# Patient Record
Sex: MALE | Race: OTHER | NOT HISPANIC OR LATINO | URBAN - METROPOLITAN AREA
[De-identification: names, ages, dates, MRNs, and addresses within clinical notes are randomized per-mention and may not be internally consistent; named-entity substitution may affect disease eponyms.]

---

## 2019-07-05 ENCOUNTER — EMERGENCY (EMERGENCY)
Facility: HOSPITAL | Age: 49
LOS: 1 days | Discharge: HOME | End: 2019-07-05
Attending: EMERGENCY MEDICINE | Admitting: EMERGENCY MEDICINE
Payer: COMMERCIAL

## 2019-07-05 VITALS
OXYGEN SATURATION: 100 % | HEART RATE: 47 BPM | SYSTOLIC BLOOD PRESSURE: 129 MMHG | TEMPERATURE: 98 F | RESPIRATION RATE: 16 BRPM | DIASTOLIC BLOOD PRESSURE: 88 MMHG

## 2019-07-05 VITALS
TEMPERATURE: 98 F | HEART RATE: 58 BPM | SYSTOLIC BLOOD PRESSURE: 108 MMHG | RESPIRATION RATE: 16 BRPM | WEIGHT: 179.9 LBS | OXYGEN SATURATION: 100 % | HEIGHT: 67 IN | DIASTOLIC BLOOD PRESSURE: 77 MMHG

## 2019-07-05 LAB
ALBUMIN SERPL ELPH-MCNC: 4.4 G/DL — SIGNIFICANT CHANGE UP (ref 3.3–5)
ALP SERPL-CCNC: 60 U/L — SIGNIFICANT CHANGE UP (ref 40–120)
ALT FLD-CCNC: 29 U/L — SIGNIFICANT CHANGE UP (ref 4–41)
ANION GAP SERPL CALC-SCNC: 11 MMO/L — SIGNIFICANT CHANGE UP (ref 7–14)
AST SERPL-CCNC: 26 U/L — SIGNIFICANT CHANGE UP (ref 4–40)
BASOPHILS # BLD AUTO: 0.02 K/UL — SIGNIFICANT CHANGE UP (ref 0–0.2)
BASOPHILS NFR BLD AUTO: 0.3 % — SIGNIFICANT CHANGE UP (ref 0–2)
BILIRUB SERPL-MCNC: 0.4 MG/DL — SIGNIFICANT CHANGE UP (ref 0.2–1.2)
BUN SERPL-MCNC: 18 MG/DL — SIGNIFICANT CHANGE UP (ref 7–23)
CALCIUM SERPL-MCNC: 9.6 MG/DL — SIGNIFICANT CHANGE UP (ref 8.4–10.5)
CHLORIDE SERPL-SCNC: 104 MMOL/L — SIGNIFICANT CHANGE UP (ref 98–107)
CO2 SERPL-SCNC: 25 MMOL/L — SIGNIFICANT CHANGE UP (ref 22–31)
CREAT SERPL-MCNC: 1.23 MG/DL — SIGNIFICANT CHANGE UP (ref 0.5–1.3)
D DIMER BLD IA.RAPID-MCNC: < 150 NG/ML — SIGNIFICANT CHANGE UP
EOSINOPHIL # BLD AUTO: 0.12 K/UL — SIGNIFICANT CHANGE UP (ref 0–0.5)
EOSINOPHIL NFR BLD AUTO: 1.9 % — SIGNIFICANT CHANGE UP (ref 0–6)
GLUCOSE SERPL-MCNC: 93 MG/DL — SIGNIFICANT CHANGE UP (ref 70–99)
HCT VFR BLD CALC: 41.1 % — SIGNIFICANT CHANGE UP (ref 39–50)
HGB BLD-MCNC: 13.4 G/DL — SIGNIFICANT CHANGE UP (ref 13–17)
IMM GRANULOCYTES NFR BLD AUTO: 0.2 % — SIGNIFICANT CHANGE UP (ref 0–1.5)
LYMPHOCYTES # BLD AUTO: 3.53 K/UL — HIGH (ref 1–3.3)
LYMPHOCYTES # BLD AUTO: 56.8 % — HIGH (ref 13–44)
MCHC RBC-ENTMCNC: 26.3 PG — LOW (ref 27–34)
MCHC RBC-ENTMCNC: 32.6 % — SIGNIFICANT CHANGE UP (ref 32–36)
MCV RBC AUTO: 80.7 FL — SIGNIFICANT CHANGE UP (ref 80–100)
MONOCYTES # BLD AUTO: 0.51 K/UL — SIGNIFICANT CHANGE UP (ref 0–0.9)
MONOCYTES NFR BLD AUTO: 8.2 % — SIGNIFICANT CHANGE UP (ref 2–14)
NEUTROPHILS # BLD AUTO: 2.03 K/UL — SIGNIFICANT CHANGE UP (ref 1.8–7.4)
NEUTROPHILS NFR BLD AUTO: 32.6 % — LOW (ref 43–77)
NRBC # FLD: 0 K/UL — SIGNIFICANT CHANGE UP (ref 0–0)
PLATELET # BLD AUTO: 229 K/UL — SIGNIFICANT CHANGE UP (ref 150–400)
PMV BLD: 9.4 FL — SIGNIFICANT CHANGE UP (ref 7–13)
POTASSIUM SERPL-MCNC: 3.9 MMOL/L — SIGNIFICANT CHANGE UP (ref 3.5–5.3)
POTASSIUM SERPL-SCNC: 3.9 MMOL/L — SIGNIFICANT CHANGE UP (ref 3.5–5.3)
PROT SERPL-MCNC: 7.6 G/DL — SIGNIFICANT CHANGE UP (ref 6–8.3)
RBC # BLD: 5.09 M/UL — SIGNIFICANT CHANGE UP (ref 4.2–5.8)
RBC # FLD: 13.2 % — SIGNIFICANT CHANGE UP (ref 10.3–14.5)
SODIUM SERPL-SCNC: 140 MMOL/L — SIGNIFICANT CHANGE UP (ref 135–145)
TROPONIN T, HIGH SENSITIVITY: < 6 NG/L — SIGNIFICANT CHANGE UP (ref ?–14)
TROPONIN T, HIGH SENSITIVITY: < 6 NG/L — SIGNIFICANT CHANGE UP (ref ?–14)
WBC # BLD: 6.22 K/UL — SIGNIFICANT CHANGE UP (ref 3.8–10.5)
WBC # FLD AUTO: 6.22 K/UL — SIGNIFICANT CHANGE UP (ref 3.8–10.5)

## 2019-07-05 PROCEDURE — 99284 EMERGENCY DEPT VISIT MOD MDM: CPT | Mod: 25

## 2019-07-05 PROCEDURE — 71046 X-RAY EXAM CHEST 2 VIEWS: CPT | Mod: 26

## 2019-07-05 PROCEDURE — 93010 ELECTROCARDIOGRAM REPORT: CPT | Mod: NC

## 2019-07-05 RX ORDER — KETOROLAC TROMETHAMINE 30 MG/ML
15 SYRINGE (ML) INJECTION ONCE
Refills: 0 | Status: DISCONTINUED | OUTPATIENT
Start: 2019-07-05 | End: 2019-07-05

## 2019-07-05 RX ORDER — ASPIRIN/CALCIUM CARB/MAGNESIUM 324 MG
81 TABLET ORAL DAILY
Refills: 0 | Status: DISCONTINUED | OUTPATIENT
Start: 2019-07-05 | End: 2019-07-09

## 2019-07-05 RX ORDER — FENTANYL CITRATE 50 UG/ML
25 INJECTION INTRAVENOUS ONCE
Refills: 0 | Status: DISCONTINUED | OUTPATIENT
Start: 2019-07-05 | End: 2019-07-05

## 2019-07-05 RX ADMIN — Medication 15 MILLIGRAM(S): at 19:56

## 2019-07-05 RX ADMIN — Medication 81 MILLIGRAM(S): at 21:14

## 2019-07-05 NOTE — ED PROVIDER NOTE - ATTENDING CONTRIBUTION TO CARE
ED Attending (Dr Mackey): I have personally performed a face to face bedside history and physical examination of this patient.  I have discussed the case with the PA and  I have personally authored the following components: HPI, ROS, Physical Exam and MDM in addition to reviewing and revising the rest of the Provider Note. 48 y.o male p/w pleuritic left sided chest pain, unclear etiology.  No PE risk factors but would d dimer.  Also, send labs and ECG to assess for CAD.  Pain control reassess.

## 2019-07-05 NOTE — ED PROVIDER NOTE - NSFOLLOWUPINSTRUCTIONS_ED_ALL_ED_FT
Follow up with Cardiologist in 24- 48 hours- referral list provided.  Follow up with your primary care physician in 48-72 hours- bring copies of your results.   Return to the Emergency Department for chest pain, shortness of breath, worsening or persistent symptoms OR ANY NEW OR CONCERNING SYMPTOMS.

## 2019-07-05 NOTE — ED PROVIDER NOTE - NSTIMEPROVIDERCAREINITIATE_GEN_ER
Patient is a 28y old  Male who presents with a chief complaint of Chest Pain, DKA (07 Jan 2019 16:10)      SUBJECTIVE / OVERNIGHT EVENTS: Pt w/ no complaints. He is excited to be going home. His father is at bedside.     Review of Systems:     MEDICATIONS  (STANDING):  aspirin  chewable 81 milliGRAM(s) Oral daily  atorvastatin 80 milliGRAM(s) Oral at bedtime  benztropine 1 milliGRAM(s) Oral two times a day  clopidogrel Tablet 75 milliGRAM(s) Oral daily  dextrose 5%. 1000 milliLiter(s) (50 mL/Hr) IV Continuous <Continuous>  dextrose 50% Injectable 12.5 Gram(s) IV Push once  dextrose 50% Injectable 25 Gram(s) IV Push once  dextrose 50% Injectable 25 Gram(s) IV Push once  diVALproex  milliGRAM(s) Oral at bedtime  diVALproex  milliGRAM(s) Oral <User Schedule>  enoxaparin Injectable 40 milliGRAM(s) SubCutaneous every 24 hours  haloperidol     Tablet 20 milliGRAM(s) Oral at bedtime  insulin glargine Injectable (LANTUS) 29 Unit(s) SubCutaneous every morning  insulin lispro (HumaLOG) corrective regimen sliding scale   SubCutaneous at bedtime  insulin lispro (HumaLOG) corrective regimen sliding scale   SubCutaneous three times a day before meals  insulin lispro Injectable (HumaLOG) 15 Unit(s) SubCutaneous before dinner  insulin lispro Injectable (HumaLOG) 15 Unit(s) SubCutaneous before lunch  insulin lispro Injectable (HumaLOG) 15 Unit(s) SubCutaneous with breakfast  lisinopril 5 milliGRAM(s) Oral daily    MEDICATIONS  (PRN):  dextrose 40% Gel 15 Gram(s) Oral once PRN Blood Glucose LESS THAN 70 milliGRAM(s)/deciliter  glucagon  Injectable 1 milliGRAM(s) IntraMuscular once PRN Glucose LESS THAN 70 milligrams/deciliter  OLANZapine Injectable 5 milliGRAM(s) IntraMuscular every 6 hours PRN Severe Agitation      PHYSICAL EXAM:    I&O's Summary  Vital Signs Last 24 Hrs  T(C): 36.7 (08 Jan 2019 06:29), Max: 36.7 (08 Jan 2019 06:29)  T(F): 98 (08 Jan 2019 06:29), Max: 98 (08 Jan 2019 06:29)  HR: 73 (08 Jan 2019 06:29) (73 - 110)  BP: 95/61 (08 Jan 2019 06:29) (95/61 - 103/69)  BP(mean): --  RR: 20 (08 Jan 2019 06:29) (19 - 20)  SpO2: 100% (08 Jan 2019 06:29) (98% - 100%)  PHYSICAL EXAM  GENERAL: NAD, well-developed  HEAD:  Atraumatic, Normocephalic  EYES: EOMI,  conjunctiva and sclera clear  NECK: Supple,  CHEST/LUNG: Clear to auscultation bilaterally; No wheeze  HEART: Regular rate and rhythm; No murmurs, rubs, or gallops  ABDOMEN: Soft, Nontender, Nondistended; Bowel sounds present  EXTREMITIES:  2+ Peripheral Pulses, No clubbing, cyanosis, or edema  PSYCH: calm  SKIN: No rashes or lesions    LABS:  CAPILLARY BLOOD GLUCOSE      POCT Blood Glucose.: 139 mg/dL (08 Jan 2019 08:37)  POCT Blood Glucose.: 164 mg/dL (07 Jan 2019 21:21)  POCT Blood Glucose.: 176 mg/dL (07 Jan 2019 18:46)  POCT Blood Glucose.: 322 mg/dL (07 Jan 2019 12:58)  POCT Blood Glucose.: >600 mg/dL (07 Jan 2019 12:55)                      RADIOLOGY & ADDITIONAL TESTS:    Imaging Personally Reviewed:    Consultant(s) Notes Reviewed:      Care Discussed with Consultants/Other Providers: 05-Jul-2019 18:31

## 2019-07-05 NOTE — ED PROVIDER NOTE - OBJECTIVE STATEMENT
48 y.o male no pmhx coming in with nonradiating, nonexertional L sided chest pain/squeezing since this morning, no associated SOB , diaphoresis or lightheadedness. Denies any trauma. No recent travel, no LE swelling. No personal or family hx of heart disease. No recent illness. Denies fevers, chills, sob, cough, n/v/d, abdominal pain, numbness, tingling, weakness. 48 y.o male no pmhx coming in with nonradiating, nonexertional , pleuritic L sided chest pain/squeezing since this morning, no associated SOB , diaphoresis or lightheadedness. Denies any trauma. No recent travel, no LE swelling. No personal or family hx of heart disease. No recent illness. Denies fevers, chills, sob, cough, n/v/d, abdominal pain, numbness, tingling, weakness. 48 y.o male non contrib pmhx coming in with nonradiating, nonexertional , pleuritic L sided chest pain/squeezing since this morning, no associated SOB , diaphoresis or lightheadedness. Denies any trauma. No recent travel, no LE swelling. No personal or family hx of heart disease. No recent illness. Denies fevers, chills, sob, cough, n/v/d, abdominal pain, numbness, tingling, weakness.

## 2019-07-05 NOTE — ED ADULT TRIAGE NOTE - CHIEF COMPLAINT QUOTE
Pt presents with a squeezing pain to left side of his chest since this morning + reproducible   PMH: Denies

## 2019-07-05 NOTE — ED ADULT NURSE REASSESSMENT NOTE - NS ED NURSE REASSESS COMMENT FT1
report received from previous shift. pt A&Ox4, sitting upright on stretcher, respirations even non labore.d sinus annette on tele, VS as noted. family at bedside. awaiting results and dispo, will ctm.

## 2019-07-05 NOTE — ED ADULT NURSE NOTE - NSIMPLEMENTINTERV_GEN_ALL_ED
Implemented All Universal Safety Interventions:  [place] to call system. Call bell, personal items and telephone within reach. Instruct patient to call for assistance. Room bathroom lighting operational. Non-slip footwear when patient is off stretcher. Physically safe environment: no spills, clutter or unnecessary equipment. Stretcher in lowest position, wheels locked, appropriate side rails in place.

## 2019-07-05 NOTE — ED PROVIDER NOTE - CLINICAL SUMMARY MEDICAL DECISION MAKING FREE TEXT BOX
48 y.o male no pmhx coming in with nonradiating, nonexertional L sided chest pain/squeezing since this morning, no associated SOB , diaphoresis or lightheadedness. Denies any trauma. No recent travel, no LE swelling. No personal or family hx of heart disease. Pain reproducible on exam, EKG non ischemic. Will check basic labs, trop, xray chest, reassess. 48 y.o male no pmhx coming in with nonradiating, nonexertional L sided chest pain/squeezing since this morning, no associated SOB , diaphoresis or lightheadedness. Denies any trauma. No recent travel, no LE swelling. No personal or family hx of heart disease. Pain reproducible on exam, EKG non ischemic. Will check basic labs, trop, xray chest, likely dc home with outpatient followup. 48 y.o male no pmhx coming in with nonradiating, nonexertional, pleuritic L sided chest pain/squeezing since this morning, no associated SOB , diaphoresis or lightheadedness. Denies any trauma. No recent travel, no LE swelling. No personal or family hx of heart disease. Pain reproducible on exam, EKG non ischemic. Will check basic labs, ddimer, trop, xray chest, reassess. 48 y.o male no pmhx coming in with nonradiating, nonexertional, pleuritic L sided chest pain/squeezing since this morning, no associated SOB , diaphoresis or lightheadedness. Denies any trauma. No recent travel, no LE swelling. No personal or family hx of heart disease. Pain reproducible on exam, EKG non ischemic. Will check basic labs, ddimer, trop, xray chest. 48 y.o male p/w pleuritic left sided chest pain, unclear etiology.  No PE risk factors but would d dimer.  Also, send labs and ECG to assess for CAD.  Pain control reassess.

## 2019-07-05 NOTE — ED PROVIDER NOTE - PROGRESS NOTE DETAILS
GAVI Rossi: Pt feeling much better, trop neg x 2. xray chest wnl. offered patient admission for further cardiac workup. Pt is a&Ox3, competent to make own decisions. understands the risks of not having further workup inpatient and risk of worsening of symptoms/heart attack/death. Pt advised to follow up with cardiologist , referral list provided. If any new or worsening symptoms to return to the ED. GAVI Rossi: Pt feeling much better, asymptomatic. trop neg x 2. xray chest wnl. offered patient admission for further cardiac workup but declining at this time. Pt is a&Ox3, competent to make own decisions. understands the risks of not having further workup inpatient and risk of worsening of symptoms/heart attack/death. Expressed understanding and prefers to followup outpatient. Pt advised to follow up with cardiologist , referral list provided. If any new or worsening symptoms to return to the ED. GAVI Rossi: Pt feeling much better, asymptomatic. trop neg x 2. xray chest wnl. offered patient admission for further cardiac workup but declining at this time. Pt is a&Ox3, appears to possess decisinoal capacity. Understands the risks of not having further workup inpatient and risk of worsening of symptoms/heart attack/death. Expressed understanding and prefers to followup outpatient. Pt advised to follow up with cardiologist , referral list provided. If any new or worsening symptoms to return to the ED.

## 2019-07-05 NOTE — ED ADULT NURSE NOTE - OBJECTIVE STATEMENT
Pt presents to ED with left sided chest pain that started yesterday. Left chest tender to touch, nonradiating. Pt AxOx3, ambulatory. Pt denies shortness of breath, breathing even and unlabored. Pt denies abd pain, n/v/d. Pt denies dysuria and hematuria. SKin clean dry and intact. 20G IVL placed in the L AC. Will continue to monitor.

## 2022-10-25 ENCOUNTER — EMERGENCY (EMERGENCY)
Facility: HOSPITAL | Age: 52
LOS: 1 days | Discharge: HOME | End: 2022-10-25
Attending: EMERGENCY MEDICINE | Admitting: EMERGENCY MEDICINE

## 2022-10-25 VITALS
OXYGEN SATURATION: 100 % | HEART RATE: 60 BPM | DIASTOLIC BLOOD PRESSURE: 89 MMHG | RESPIRATION RATE: 18 BRPM | SYSTOLIC BLOOD PRESSURE: 128 MMHG | TEMPERATURE: 98 F | HEIGHT: 67 IN

## 2022-10-25 VITALS
TEMPERATURE: 98 F | OXYGEN SATURATION: 100 % | HEART RATE: 55 BPM | SYSTOLIC BLOOD PRESSURE: 121 MMHG | DIASTOLIC BLOOD PRESSURE: 83 MMHG | RESPIRATION RATE: 18 BRPM

## 2022-10-25 LAB
ALBUMIN SERPL ELPH-MCNC: 4.2 G/DL — SIGNIFICANT CHANGE UP (ref 3.3–5)
ALP SERPL-CCNC: 64 U/L — SIGNIFICANT CHANGE UP (ref 40–120)
ALT FLD-CCNC: 32 U/L — SIGNIFICANT CHANGE UP (ref 4–41)
ANION GAP SERPL CALC-SCNC: 10 MMOL/L — SIGNIFICANT CHANGE UP (ref 7–14)
AST SERPL-CCNC: 29 U/L — SIGNIFICANT CHANGE UP (ref 4–40)
B PERT DNA SPEC QL NAA+PROBE: SIGNIFICANT CHANGE UP
B PERT+PARAPERT DNA PNL SPEC NAA+PROBE: SIGNIFICANT CHANGE UP
BASE EXCESS BLDV CALC-SCNC: -1.4 MMOL/L — SIGNIFICANT CHANGE UP (ref -2–3)
BASOPHILS # BLD AUTO: 0.04 K/UL — SIGNIFICANT CHANGE UP (ref 0–0.2)
BASOPHILS NFR BLD AUTO: 0.6 % — SIGNIFICANT CHANGE UP (ref 0–2)
BILIRUB SERPL-MCNC: 0.2 MG/DL — SIGNIFICANT CHANGE UP (ref 0.2–1.2)
BLOOD GAS VENOUS COMPREHENSIVE RESULT: SIGNIFICANT CHANGE UP
BORDETELLA PARAPERTUSSIS (RAPRVP): SIGNIFICANT CHANGE UP
BUN SERPL-MCNC: 17 MG/DL — SIGNIFICANT CHANGE UP (ref 7–23)
C PNEUM DNA SPEC QL NAA+PROBE: SIGNIFICANT CHANGE UP
CALCIUM SERPL-MCNC: 8.8 MG/DL — SIGNIFICANT CHANGE UP (ref 8.4–10.5)
CHLORIDE BLDV-SCNC: 104 MMOL/L — SIGNIFICANT CHANGE UP (ref 96–108)
CHLORIDE SERPL-SCNC: 106 MMOL/L — SIGNIFICANT CHANGE UP (ref 98–107)
CO2 BLDV-SCNC: 26.7 MMOL/L — HIGH (ref 22–26)
CO2 SERPL-SCNC: 22 MMOL/L — SIGNIFICANT CHANGE UP (ref 22–31)
CREAT SERPL-MCNC: 1.02 MG/DL — SIGNIFICANT CHANGE UP (ref 0.5–1.3)
EGFR: 90 ML/MIN/1.73M2 — SIGNIFICANT CHANGE UP
EOSINOPHIL # BLD AUTO: 0.49 K/UL — SIGNIFICANT CHANGE UP (ref 0–0.5)
EOSINOPHIL NFR BLD AUTO: 6.9 % — HIGH (ref 0–6)
FLUAV SUBTYP SPEC NAA+PROBE: SIGNIFICANT CHANGE UP
FLUBV RNA SPEC QL NAA+PROBE: SIGNIFICANT CHANGE UP
GAS PNL BLDV: 135 MMOL/L — LOW (ref 136–145)
GLUCOSE BLDV-MCNC: 111 MG/DL — HIGH (ref 70–99)
GLUCOSE SERPL-MCNC: 117 MG/DL — HIGH (ref 70–99)
HADV DNA SPEC QL NAA+PROBE: SIGNIFICANT CHANGE UP
HCO3 BLDV-SCNC: 25 MMOL/L — SIGNIFICANT CHANGE UP (ref 22–29)
HCOV 229E RNA SPEC QL NAA+PROBE: SIGNIFICANT CHANGE UP
HCOV HKU1 RNA SPEC QL NAA+PROBE: SIGNIFICANT CHANGE UP
HCOV NL63 RNA SPEC QL NAA+PROBE: SIGNIFICANT CHANGE UP
HCOV OC43 RNA SPEC QL NAA+PROBE: SIGNIFICANT CHANGE UP
HCT VFR BLD CALC: 39.2 % — SIGNIFICANT CHANGE UP (ref 39–50)
HCT VFR BLDA CALC: 39 % — SIGNIFICANT CHANGE UP (ref 39–51)
HGB BLD CALC-MCNC: 13.1 G/DL — SIGNIFICANT CHANGE UP (ref 13–17)
HGB BLD-MCNC: 12.7 G/DL — LOW (ref 13–17)
HMPV RNA SPEC QL NAA+PROBE: SIGNIFICANT CHANGE UP
HPIV1 RNA SPEC QL NAA+PROBE: SIGNIFICANT CHANGE UP
HPIV2 RNA SPEC QL NAA+PROBE: SIGNIFICANT CHANGE UP
HPIV3 RNA SPEC QL NAA+PROBE: SIGNIFICANT CHANGE UP
HPIV4 RNA SPEC QL NAA+PROBE: SIGNIFICANT CHANGE UP
IANC: 2.6 K/UL — SIGNIFICANT CHANGE UP (ref 1.8–7.4)
IMM GRANULOCYTES NFR BLD AUTO: 0.1 % — SIGNIFICANT CHANGE UP (ref 0–0.9)
LACTATE BLDV-MCNC: 1.5 MMOL/L — SIGNIFICANT CHANGE UP (ref 0.5–2)
LYMPHOCYTES # BLD AUTO: 3.36 K/UL — HIGH (ref 1–3.3)
LYMPHOCYTES # BLD AUTO: 47.2 % — HIGH (ref 13–44)
M PNEUMO DNA SPEC QL NAA+PROBE: SIGNIFICANT CHANGE UP
MCHC RBC-ENTMCNC: 27.5 PG — SIGNIFICANT CHANGE UP (ref 27–34)
MCHC RBC-ENTMCNC: 32.4 GM/DL — SIGNIFICANT CHANGE UP (ref 32–36)
MCV RBC AUTO: 85 FL — SIGNIFICANT CHANGE UP (ref 80–100)
MONOCYTES # BLD AUTO: 0.62 K/UL — SIGNIFICANT CHANGE UP (ref 0–0.9)
MONOCYTES NFR BLD AUTO: 8.7 % — SIGNIFICANT CHANGE UP (ref 2–14)
NEUTROPHILS # BLD AUTO: 2.6 K/UL — SIGNIFICANT CHANGE UP (ref 1.8–7.4)
NEUTROPHILS NFR BLD AUTO: 36.5 % — LOW (ref 43–77)
NRBC # BLD: 0 /100 WBCS — SIGNIFICANT CHANGE UP (ref 0–0)
NRBC # FLD: 0 K/UL — SIGNIFICANT CHANGE UP (ref 0–0)
PCO2 BLDV: 49 MMHG — SIGNIFICANT CHANGE UP (ref 42–55)
PH BLDV: 7.32 — SIGNIFICANT CHANGE UP (ref 7.32–7.43)
PLATELET # BLD AUTO: 208 K/UL — SIGNIFICANT CHANGE UP (ref 150–400)
PO2 BLDV: 30 MMHG — SIGNIFICANT CHANGE UP
POTASSIUM BLDV-SCNC: 3.7 MMOL/L — SIGNIFICANT CHANGE UP (ref 3.5–5.1)
POTASSIUM SERPL-MCNC: 3.9 MMOL/L — SIGNIFICANT CHANGE UP (ref 3.5–5.3)
POTASSIUM SERPL-SCNC: 3.9 MMOL/L — SIGNIFICANT CHANGE UP (ref 3.5–5.3)
PROT SERPL-MCNC: 6.9 G/DL — SIGNIFICANT CHANGE UP (ref 6–8.3)
RAPID RVP RESULT: SIGNIFICANT CHANGE UP
RBC # BLD: 4.61 M/UL — SIGNIFICANT CHANGE UP (ref 4.2–5.8)
RBC # FLD: 13.4 % — SIGNIFICANT CHANGE UP (ref 10.3–14.5)
RSV RNA SPEC QL NAA+PROBE: SIGNIFICANT CHANGE UP
RV+EV RNA SPEC QL NAA+PROBE: SIGNIFICANT CHANGE UP
SAO2 % BLDV: 53 % — SIGNIFICANT CHANGE UP
SARS-COV-2 RNA SPEC QL NAA+PROBE: SIGNIFICANT CHANGE UP
SODIUM SERPL-SCNC: 138 MMOL/L — SIGNIFICANT CHANGE UP (ref 135–145)
TROPONIN T, HIGH SENSITIVITY RESULT: 6 NG/L — SIGNIFICANT CHANGE UP
TROPONIN T, HIGH SENSITIVITY RESULT: 7 NG/L — SIGNIFICANT CHANGE UP
WBC # BLD: 7.12 K/UL — SIGNIFICANT CHANGE UP (ref 3.8–10.5)
WBC # FLD AUTO: 7.12 K/UL — SIGNIFICANT CHANGE UP (ref 3.8–10.5)

## 2022-10-25 PROCEDURE — 76705 ECHO EXAM OF ABDOMEN: CPT | Mod: 26

## 2022-10-25 PROCEDURE — 93016 CV STRESS TEST SUPVJ ONLY: CPT | Mod: GC

## 2022-10-25 PROCEDURE — 93010 ELECTROCARDIOGRAM REPORT: CPT

## 2022-10-25 PROCEDURE — 93018 CV STRESS TEST I&R ONLY: CPT | Mod: GC

## 2022-10-25 PROCEDURE — 71046 X-RAY EXAM CHEST 2 VIEWS: CPT | Mod: 26

## 2022-10-25 PROCEDURE — 99236 HOSP IP/OBS SAME DATE HI 85: CPT

## 2022-10-25 PROCEDURE — 93306 TTE W/DOPPLER COMPLETE: CPT | Mod: 26

## 2022-10-25 PROCEDURE — 99284 EMERGENCY DEPT VISIT MOD MDM: CPT

## 2022-10-25 PROCEDURE — 99053 MED SERV 10PM-8AM 24 HR FAC: CPT

## 2022-10-25 PROCEDURE — 78452 HT MUSCLE IMAGE SPECT MULT: CPT | Mod: 26,MA

## 2022-10-25 RX ORDER — FAMOTIDINE 10 MG/ML
20 INJECTION INTRAVENOUS ONCE
Refills: 0 | Status: COMPLETED | OUTPATIENT
Start: 2022-10-25 | End: 2022-10-25

## 2022-10-25 RX ORDER — ASPIRIN/CALCIUM CARB/MAGNESIUM 324 MG
324 TABLET ORAL ONCE
Refills: 0 | Status: COMPLETED | OUTPATIENT
Start: 2022-10-25 | End: 2022-10-25

## 2022-10-25 RX ADMIN — Medication 324 MILLIGRAM(S): at 02:11

## 2022-10-25 RX ADMIN — FAMOTIDINE 20 MILLIGRAM(S): 10 INJECTION INTRAVENOUS at 02:13

## 2022-10-25 RX ADMIN — Medication 30 MILLILITER(S): at 02:14

## 2022-10-25 NOTE — ED PROVIDER NOTE - PROGRESS NOTE DETAILS
Jesse Bell MD  troponin 6, CXR showed clear lungs. Will admit to CDU for cards stress test and ECHO. Patient reports no change from prior and is amenable to plan.

## 2022-10-25 NOTE — ED CDU PROVIDER DISPOSITION NOTE - CLINICAL COURSE
52 yo M with no significant PMH presents to ED c/o chest pain and SOB for several days. Reports pain localized to the left chest with radiation to left shoulder and back, has been constant, rated 10/10, sharp. He has not had similar symptoms prior. He also endorses associated SOB worse with exertion, dizziness, blurred vision, palpitations, and abdominal pain. He has not seen a physician in several years due to not having any health issues that he is aware of. He otherwise denies fever, chills, nausea, vomiting, urinary symptoms, LE pain/swelling, recent travel, sick contacts, recent surgeries. no family history of CAD.  In ER, ekg nonischemic, CXR clear, serial trop negative, RUQ US negative acute finding. Pt sent to cdu for stress, TTE, tele. Stress test abnormal, finding suggestive of mild cardiomyopathy, echo with EF of 54%. Pt seen by tele doc Dr. Peralta, states patient is clear for discharge for outpatient follow up. Pt is medically stable for discharge and follow up with PMD and cardiology. 50 yo M with no significant PMH presents to ED c/o chest pain and SOB for several days. Reports pain localized to the left chest with radiation to left shoulder and back, has been constant, rated 10/10, sharp. He has not had similar symptoms prior. He also endorses associated SOB worse with exertion, dizziness, blurred vision, palpitations, and abdominal pain. He has not seen a physician in several years due to not having any health issues that he is aware of. He otherwise denies fever, chills, nausea, vomiting, urinary symptoms, LE pain/swelling, recent travel, sick contacts, recent surgeries. no family history of CAD.  In ER, ekg nonischemic, CXR clear, serial trop negative, RUQ US negative acute finding. Pt sent to cdu for stress, TTE, tele. Stress test abnormal, finding suggestive of mild cardiomyopathy, echo with EF of 54%. Pt seen by tele doc Dr. Peralta, states patient is clear for discharge for outpatient follow up. Pt is medically stable for discharge and follow up with PMD and cardiology. All results d/w patient-copies given. Instructed to bring to f/u appointments, return precautions discussed.

## 2022-10-25 NOTE — ED CDU PROVIDER INITIAL DAY NOTE - NS ED ROS FT
General: denies fever, chills  HENT: denies nasal congestion, rhinorrhea  Eyes: BLURRED VISION  CV: CP, PALPITATIONS  Resp: denies difficulty breathing, cough  Abdominal: denies nausea, vomiting, diarrhea, abdominal pain  : denies urinary pain or discharge  MSK: denies muscle aches, leg swelling  Neuro: DIZZINESS, denies headaches, numbness, tingling  Skin: denies rashes, bruises  - jorge tai

## 2022-10-25 NOTE — ED CDU PROVIDER INITIAL DAY NOTE - MEDICAL DECISION MAKING DETAILS
50 yo male who denies any PMHx presenting with chest pain and SOB. Symptoms started several days ago. Pain localized to the left chest with radiation to left shoulder and back, has been constant, rated 10/10, sharp. He has not had similar symptoms prior. He also endorses associated SOB worse with exertion, dizziness, blurred vision, palpitations, and abdominal pain. He has not seen a physician in several years due to not having any health issues that he is aware of. He otherwise denies fever, chills, nausea, vomiting, urinary symptoms, LE pain/swelling, recent travel, sick contacts, recent surgeries. has no FH CAD  in ER, ekg, labs wnl, sent to cdu for stress, TTE, tele

## 2022-10-25 NOTE — ED PROVIDER NOTE - ATTENDING CONTRIBUTION TO CARE
I performed a face-to-face evaluation of the patient and performed a history and physical examination along with the resident or ACP, and/or medical student above.  I agree with the history and physical examination as documented by the resident or ACP, and/or medical student above.  Ramon:  52yo [place] Male, denies pmh but has not seen a doctor in decades, p/w Left retrosternal chest pain x few days, intermittent, w/ rads to Left shoulder and Left upper back, worse on exertion, a/w sob, constant x last few hours. States had a near syncopal episode ystdy while sitting down. Reports self-diagnosed acid reflux but states current symptoms feel different that heartburn. ECG is non-ischemic (including 2nd interval ecg). Trop 6. Mild epigastric>RUQ ttp, therefore performed US for r/o acute cholecystitis - does not appear to meet criteria. Will CDU for stress and echo.

## 2022-10-25 NOTE — CONSULT NOTE ADULT - SUBJECTIVE AND OBJECTIVE BOX
Cardiology/Vascular Medicine Inpatient Consultation Note    Date of Admission:        CHIEF COMPLAINT:        HISTORY OF PRESENT ILLNESS:  HPI:          Allergies    No Known Allergies    Intolerances    	    MEDICATIONS:                  PAST MEDICAL & SURGICAL HISTORY:  No pertinent past medical history      No significant past surgical history          FAMILY HISTORY:  No pertinent family history in first degree relatives        SOCIAL HISTORY:    [ ] Non-smoker  [ ] Smoker  [ ] Alcohol    REVIEW OF SYSTEMS:  CONSTITUTIONAL: No fever, weight loss, or fatigue  EYES: No eye pain, visual disturbances, or discharge  ENMT:  No difficulty hearing, tinnitus, vertigo; No sinus or throat pain  NECK: No pain or stiffness  RESPIRATORY: No cough, wheezing, chills or hemoptysis; No Shortness of Breath  CARDIOVASCULAR: No chest pain, palpitations, passing out, dizziness, or leg swelling  GASTROINTESTINAL: No abdominal or epigastric pain. No nausea, vomiting, or hematemesis; No diarrhea or constipation. No melena or hematochezia.  GENITOURINARY: No dysuria, frequency, hematuria, or incontinence  NEUROLOGICAL: No headaches, memory loss, loss of strength, numbness, or tremors  SKIN: No itching, burning, rashes, or lesions   LYMPH Nodes: No enlarged glands  ENDOCRINE: No heat or cold intolerance; No hair loss  MUSCULOSKELETAL: No joint pain or swelling; No muscle, back, or extremity pain  PSYCHIATRIC: No depression, anxiety, mood swings, or difficulty sleeping  HEME/LYMPH: No easy bruising, or bleeding gums  ALLERY AND IMMUNOLOGIC: No hives or eczema	    [ ] All others negative	  [ ] Unable to obtain    PHYSICAL EXAM:  T(C): 36.7 (10-25-22 @ 13:48), Max: 36.7 (10-25-22 @ 00:09)  HR: 55 (10-25-22 @ 13:48) (51 - 62)  BP: 121/83 (10-25-22 @ 13:48) (121/83 - 150/105)  RR: 18 (10-25-22 @ 13:48) (14 - 18)  SpO2: 100% (10-25-22 @ 13:48) (100% - 100%)  Wt(kg): --  I&O's Summary      Appearance: Normal	  HEENT:   Normal oral mucosa, PERRL, EOMI	  Lymphatic: No lymphadenopathy  Cardiovascular: Normal S1 S2, No JVD, No murmurs, No edema  Respiratory: Lungs clear to auscultation	  Psychiatry: A & O x 3, Mood & affect appropriate  Gastrointestinal:  Soft, Non-tender, + BS	  Skin: No rashes, No ecchymoses, No cyanosis	  Neurologic: Non-focal  Extremities: Normal range of motion, No clubbing, cyanosis or edema  Vascular: Peripheral pulses palpable 2+ bilaterally      LABS:	 	    CBC Full  -  ( 25 Oct 2022 01:40 )  WBC Count : 7.12 K/uL  Hemoglobin : 12.7 g/dL  Hematocrit : 39.2 %  Platelet Count - Automated : 208 K/uL  Mean Cell Volume : 85.0 fL  Mean Cell Hemoglobin : 27.5 pg  Mean Cell Hemoglobin Concentration : 32.4 gm/dL  Auto Neutrophil # : 2.60 K/uL  Auto Lymphocyte # : 3.36 K/uL  Auto Monocyte # : 0.62 K/uL  Auto Eosinophil # : 0.49 K/uL  Auto Basophil # : 0.04 K/uL  Auto Neutrophil % : 36.5 %  Auto Lymphocyte % : 47.2 %  Auto Monocyte % : 8.7 %  Auto Eosinophil % : 6.9 %  Auto Basophil % : 0.6 %    10-25    138  |  106  |  17  ----------------------------<  117<H>  3.9   |  22  |  1.02    Ca    8.8      25 Oct 2022 01:40    TPro  6.9  /  Alb  4.2  /  TBili  0.2  /  DBili  x   /  AST  29  /  ALT  32  /  AlkPhos  64  10-25      proBNP:   Lipid Profile:   HgA1c:   TSH:       CARDIAC MARKERS:            TELEMETRY: 	    ECG:   	  RADIOLOGY:  OTHER: 	      PREVIOUS DIAGNOSTIC CARDIOVASCULAR TESTING:      [ ]  Echocardiogram:  [ ]  Catheterization:  [ ]  Stress test:    [ ]  Vascular studies:  	  	     Cardiology/Vascular Medicine Inpatient Consultation Note    HISTORY OF PRESENT ILLNESS:    Patient presented to the [facility] ED with complaints of progressively worsening left-sided chest pressure and dyspnea.     Mr. Shin is a 52 yo man with known prior medical history presents with chest pain and SOB.   Symptoms started several days ago. Pain localized to his left chest and radiates to his left shoulder and back.    Denies fevers/chills, dyspnea, palpitations, orthopnea/PND, edema.   No recent illness. No history of DVT or PE.    12-lead ECG and bedside telemetry --> SR, no events  Cardiac biomarkers flat.  COVID PCR negative.  CXR unremarkable for acute findings.    At the time of my evaluation, the patient appeared clinically and hemodynamically stable.  Unremarkable physical exam.  He appeared euvolemic on my exam.    Patient already scheduled for echocardiogram and nuclear stress test by the CDU team.  No evidence of ischemia noted on the nuclear stress test. While "diffuse hypokinesis" with LVEF 47% was noted on the nuclear stress test, I reviewed the echocardiogram which noted normal biventricular systolic function.  Reassurance provided to patient.    From the cardiac perspective, the patient may be discharged to home without the need for additional inpatient cardiac testing.  He can f/u with me within 1-2 weeks for further evaluation as needed.  Assessment and recommendations reviewed with the CDU team prior to patient's discharge.      Allergies  No Known Allergies    MEDICATIONS:  None    PAST MEDICAL & SURGICAL HISTORY:  No pertinent past medical history    No significant past surgical history    FAMILY HISTORY:  No pertinent family history in first degree relatives    SOCIAL HISTORY:    As above, as per chart notes    REVIEW OF SYSTEMS:  As above, as per chart notes    PHYSICAL EXAM:  T(C): 36.7 (10-25-22 @ 13:48), Max: 36.7 (10-25-22 @ 00:09)  HR: 55 (10-25-22 @ 13:48) (51 - 62)  BP: 121/83 (10-25-22 @ 13:48) (121/83 - 150/105)  RR: 18 (10-25-22 @ 13:48) (14 - 18)  SpO2: 100% (10-25-22 @ 13:48) (100% - 100%)    Appearance: NAD  HEENT:   No JVD  Cardiovascular: Normal S1 S2,  Respiratory: Lungs clear to auscultation	  Psychiatry: Awake, alert  Gastrointestinal:  Soft, Non-tender, + BS	  Neurologic: Non-focal  Extremities: No edema      LABS:	 	                          12.7   7.12  )-----------( 208      ( 25 Oct 2022 01:40 )             39.2     10-25    138  |  106  |  17  ----------------------------<  117<H>  3.9   |  22  |  1.02    Ca    8.8      25 Oct 2022 01:40    TPro  6.9  /  Alb  4.2  /  TBili  0.2  /  DBili  x   /  AST  29  /  ALT  32  /  AlkPhos  64  10-25    < from: Xray Chest 2 Views PA/Lat (10.25.22 @ 02:14) >    ACC: [id number] EXAM:  XR CHEST PA LAT 2V                          PROCEDURE DATE:  10/25/2022          INTERPRETATION:  INDICATION: Chest pain.    COMPARISON: Multiple prior chest x-rays with most recent dated 2019.    FINDINGS:  Heart/Vascular:Heart size is normal.  Pulmonary: Clear lungs. No pneumothorax or pleural effusion.  Bones: No acute bony pathology.    Impression:  Clear lungs.        --- End of Report ---          JANNETH GRIFFIN MD; Resident Radiologist  This document has been electronically signed.  CAL ESPARZA MD; Attending Radiologist  This document has been electronically signed. Oct 25 2022  6:09AM    < end of copied text >  < from: Transthoracic Echocardiogram (10.25.22 @ 06:22) >    Patient name: PRINCE SHIN  YOB: 1970   Age: 51 (M)   MR#: [id number]  Study Date: 10/25/2022  Location: O/PSonographer: Lauren Finkelstein, [facility]  Study quality: Technically good  Referring Physician: ARVIN REYES / Jus Peralta MD, PhD  Blood Pressure: 131/88 mmHg  Height: 170 cm  Weight: 79 kg  BSA: 1.9 m2  ------------------------------------------------------------------------  PROCEDURE: Transthoracic echocardiogram with 2-D, M-Mode  and complete spectral and color flow Doppler.  INDICATION: Chest pain, unspecified (R07.9)  ------------------------------------------------------------------------  DIMENSIONS:  Dimensions:     Normal Values:  LA:     3.7 cm    2.0 - 4.0 cm  Ao:     3.0 cm    2.0 - 3.8 cm  SEPTUM: 0.9 cm    0.6 - 1.2 cm  PWT:    0.9 cm    0.6 - 1.1 cm  LVIDd:  4.5 cm    3.0 - 5.6 cm  LVIDs:  3.2 cm    1.8 - 4.0 cm  Derived Variables:  LVMI: 70 g/m2  RWT: 0.40  Fractional short: 29 %  Ejection Fraction (Modified Marte Rule): 54 %  ------------------------------------------------------------------------  OBSERVATIONS:  Mitral Valve: Normal mitral valve. Minimal mitral  regurgitation.  Aortic Root: Normal aortic root.  Aortic Valve: Normal trileaflet aortic valve.  Left Atrium: Normal left atrium.  LA volume index = 27  cc/m2.  Left Ventricle: Normal left ventricular systolic function.  No segmental wall motion abnormalities. Normal left  ventricular internal dimensions and wall thicknesses.  Normal left ventricular diastolic function.  Right Heart: Normal right atrium. Normal right ventricular  size and function. Normal tricuspid valve. Minimal  tricuspid regurgitation. Normal pulmonic valve. Minimal  pulmonic regurgitation.  Pericardium/PleuraNormal pericardium with no pericardial  effusion.  ------------------------------------------------------------------------  CONCLUSIONS:  1. Normal trileaflet aortic valve.  2. Normal left ventricular internal dimensions and wall  thicknesses.  3. Normal left ventricular systolic function. No segmental  wall motion abnormalities.  4. Normal right ventricular size and function.  ------------------------------------------------------------------------  Confirmed on  10/25/2022 - 14:23:53 by Nancy Sheehan M.D. RPVI  ------------------------------------------------------------------------    < end of copied text >  < from: Nuclear Stress Test-Exercise (Nuclear Stress Test-Exercise .) (10.25.22 @ 10:50) >  PATIENT: PRINCE SHIN  : 1970   AGE: 51 (M)   MR#: [id number]  STUDY DATE: 10/25/2022  LOCATION: O/P  REF. PHYSICIAN(S): ARVIN REYES / Jus Peralta MD, PhD  FELLOW: Stuart Dallas M.D.  ------------------------------------------------------------------------  TYPE OF TEST: Stress SESTAMIBI  INDICATION: Chest pain, unspecified (R07.9)  ------------------------------------------------------------------------  HISTORY:  CARDIAC HISTORY: 51 year old male with past medical  history of hyperlipidemia presents with chest pain.  OTHER HISTORY: None  RISK FACTORS: Elevated Cholesterol  MEDICATIONS: Aspirin, Maalox, Pepcid  ------------------------------------------------------------------------  BASELINE ELECTROCARDIOGRAM:  Rhythm: Sinus Bradycardia - 48 BPM  ST: No significant ST abnormalities.  ------------------------------------------------------------------------  EXERCISE RESULTS:  TIME      SPEED    GRADE  HR      BP  Baseline  Standing   N/A  48  132/93  03:00     1.7 MPH    10%  05854/90  06:00     2.5 MPH    12% 112  158/92  08:38     3.4 MPH    14% 148  168/95  02:00     Recovery        84  161/86  04:00     Recovery        67  135/86  05:19     Recovery        71  130/82  ------------------------------------------------------------------------  Protocol: Fam   Exercise Duration: 8: 38 Min  HR: Baseline HR: 48 bpm   Peak HR: 148 bpm (87% of MPHR)  MPHR: 170 bpm   85% of MPHR: 145 bpm  BP: Baseline BP: 132/93 mmHg   Peak BP: 168/95 mmHg   Peak  RPP: 19951 (Rate PressureProduct)  Last Caffeine intake: 12 hrs  Terminated: Fatigue  Performance: 9 METS, Fair for age and gender.  Comments: The patient walked on treadmill, target heart  rate was achieved.  ------------------------------------------------------------------------  SYMPTOMS/FINDINGS:  Symptoms: Dyspnea, No chest pain  Chest pain: No chest pain with exercise  Treatment: None  ------------------------------------------------------------------------  ECG ABNORMALITIES DURING/AFTER STRESS:   Abnormalities: None  ------------------------------------------------------------------------  NEW ARRHYTHMIAS DEVELOPED DURING/AFTER STRESS:  Occasional VPDs occurred during recovery. One couplet  noted in recovery.  ------------------------------------------------------------------------  STRESS TEST IMPRESSIONS:  Exercise capacity: 9 METS, Fair for age and gender. 87% of  MPHR.  Chest Pain: No chest pain with exercise.  Symptom: Dyspnea, No chest pain.  HR Response: Appropriate.  BP Response: Appropriate.  Heart Rhythm: Sinus Bradycardia - 48 BPM.  Baseline ECG: No significant ST abnormalities.  ECG Abnormalities: None.  Arrhythmia: Occasional VPDs occurred during recovery. One  couplet noted in recovery. .  Cruz Score: 8  ------------------------------------------------------------------------  PROCEDURE:  7.87 mCi of Tc99m Sestamibi were injected during stress  protocol. Approximately 45 minutes later, tomographic  images were obtained in a 180 degree arc from right  anterior oblique to left anterior oblique with 64 stops.  The tomographic slices were reconstructed in 3 orthogonal  planes (short axis, horizontal long axis and vertical long  axis).  Interpretation was performed both by visual and  quantitative analysis.  Stress images were acquired using CZT-based system with  pinhole collimation (AUTOFACT 530 c, [employer]),  and reconstructed using MLEM algorithm. Images were  re-acquired with the patient in a prone position.  ------------------------------------------------------------------------  NUCLEAR FINDINGS:  Review of raw data shows: The study is of adequate  technical quality with heart not centered in FOV  The left ventricle was normal in size. Normal myocardial  perfusion,with no evidence of infarction or inducible  ischemia. There was a small, mild distal inferolateral  defect that was no longer significant on prone imaging for  attenuation correction.  ------------------------------------------------------------------------  GATED ANALYSIS:  Post-stress gated wall motion analysis was performed (LVEF  = 47 %;LVEDV = 92 ml.), revealing mild diffuse  hypokinesis. The RV appeared mildly hypocontractile.  ------------------------------------------------------------------------  IMPRESSIONS:Abnormal Study  * Myocardial Perfusion SPECT results are abnormal.  * Normal myocardial perfusion,with no evidence of  infarction or inducible ischemia. There was a small, mild  distal inferolateral defect that was no longer significant  on prone imaging for attenuation correction.  * However, finding suggest mild cardiomyopathy.  Post-stress gated wall motion analysis was performed (LVEF  = 47 %;LVEDV = 92 ml.), revealing mild diffuse  hypokinesis. The RV appeared mildly hypocontractile.  ------------------------------------------------------------------------  Confirmed on  10/25/2022 - 12:19:31 by Mitchell Veronica M.D.  ------------------------------------------------------------------------    < end of copied text >

## 2022-10-25 NOTE — ED CDU PROVIDER INITIAL DAY NOTE - ATTENDING APP SHARED VISIT CONTRIBUTION OF CARE
CDU MD Navarro:  I performed a face to face bedside interview with patient regarding history of present illness, review of symptoms and past medical history. I completed an independent physical exam.  I have discussed patient's plan of care with PA.   I agree with note as stated above, having amended the EMR as needed to reflect my findings. I have discussed the assessment and plan of care.  This includes during the time I functioned as the attending physician for this patient.    GENERAL: well appearing in no acute distress, non-toxic appearing  HEAD: normocephalic, atraumatic  HEENT: normal conjunctiva, oral mucosa moist, neck supple  CARDIAC: regular rate and rhythm, normal S1S2, no appreciable murmurs, 2+ pulses in UE/LE b/l  PULM: normal breath sounds, clear to ascultation bilaterally, no rales, rhonchi, wheezing  GI: RUQ TENDERNESS without guarding, abdomen nondistended, soft, no guarding, rebound tenderness  : no CVA tenderness b/l, no suprapubic tenderness  NEURO: no focal motor or sensory deficits, CN2-12 intact, normal speech, PERRLA, EOMI, normal gait, AAOx3  MSK: no peripheral edema, no calf tenderness b/l  SKIN: well-perfused, extremities warm, no visible rashes

## 2022-10-25 NOTE — ED CDU PROVIDER DISPOSITION NOTE - PATIENT PORTAL LINK FT
You can access the FollowMyHealth Patient Portal offered by [facility] by registering at the following website: [email or web address]. By joining [employer] [facility]’s FollowMyHealth portal, you will also be able to view your health information using other applications (apps) compatible with our system.

## 2022-10-25 NOTE — ED ADULT TRIAGE NOTE - NS ED TRIAGE AVPU SCALE
Alert-The patient is alert, awake and responds to voice. The patient is oriented to time, place, and person. The triage nurse is able to obtain subjective information.
clear

## 2022-10-25 NOTE — ED ADULT NURSE NOTE - CHIEF COMPLAINT QUOTE
Pt c/o left sided chest pain and sob starting this afternoon. Denies abd pain, n/v/d, fevers/chills. Appears comfortable. Denies Pmhx

## 2022-10-25 NOTE — CONSULT NOTE ADULT - ASSESSMENT
Patient presented to the [facility] ED with complaints of progressively worsening left-sided chest pressure and dyspnea.     Mr. Cabral is a 52 yo man with known prior medical history presents with chest pain and SOB.   Symptoms started several days ago. Pain localized to his left chest and radiates to his left shoulder and back.    Denies fevers/chills, dyspnea, palpitations, orthopnea/PND, edema.   No recent illness. No history of DVT or PE.    12-lead ECG and bedside telemetry --> SR, no events  Cardiac biomarkers flat.  COVID PCR negative.  CXR unremarkable for acute findings.    At the time of my evaluation, the patient appeared clinically and hemodynamically stable.  Unremarkable physical exam.  He appeared euvolemic on my exam.    Patient already scheduled for echocardiogram and nuclear stress test by the CDU team.  No evidence of ischemia noted on the nuclear stress test. While "diffuse hypokinesis" with LVEF 47% was noted on the nuclear stress test, I reviewed the echocardiogram which noted normal biventricular systolic function.  Reassurance provided to patient.    From the cardiac perspective, the patient may be discharged to home without the need for additional inpatient cardiac testing.  He can f/u with me within 1-2 weeks for further evaluation as needed.  Assessment and recommendations reviewed with the CDU team prior to patient's discharge.

## 2022-10-25 NOTE — ED CDU PROVIDER DISPOSITION NOTE - CARE PROVIDER_API CALL
Jus Peralta (MD; PhD)  Cardiology; Internal Medicine; Vascular Medicine  [phone] [place] [address], [address]  [place] [address]  Phone: [phone]  Fax: [phone]  Follow Up Time: Urgent

## 2022-10-25 NOTE — ED PROVIDER NOTE - CLINICAL SUMMARY MEDICAL DECISION MAKING FREE TEXT BOX
52 yo male with no known PMHx presents with CP and SOB. CP is left sided with radiation to L shoulder and back. He has not been following up a physician in some years. He has associated dizziness, SOB and has RUQ tenderness on exam. EKG showed sinus annette. Presentation c/f ACS vs gallbladder disease vs GERD. Will order CBC, CMP, trop, VBG, RVP, RUQ US, CXR. Will give ASA, pepcid, maalox. Expect CDU vs admit for stress test and ECHO

## 2022-10-25 NOTE — ED PROVIDER NOTE - PHYSICAL EXAMINATION
[facility]
GENERAL: well appearing in no acute distress, non-toxic appearing  HEAD: normocephalic, atraumatic  HEENT: normal conjunctiva, oral mucosa moist, neck supple  CARDIAC: regular rate and rhythm, normal S1S2, no appreciable murmurs, 2+ pulses in UE/LE b/l  PULM: normal breath sounds, clear to ascultation bilaterally, no rales, rhonchi, wheezing  GI: RUQ TENDERNESS without guarding, abdomen nondistended, soft, no guarding, rebound tenderness  : no CVA tenderness b/l, no suprapubic tenderness  NEURO: no focal motor or sensory deficits, CN2-12 intact, normal speech, PERRLA, EOMI, normal gait, AAOx3  MSK: no peripheral edema, no calf tenderness b/l  SKIN: well-perfused, extremities warm, no visible rashes  PSYCH: appropriate mood and affect

## 2022-10-25 NOTE — ED PROVIDER NOTE - NS ED ROS FT
General: denies fever, chills  HENT: denies nasal congestion, rhinorrhea  Eyes: BLURRED VISION  CV: CP, PALPITATIONS  Resp: denies difficulty breathing, cough  Abdominal: denies nausea, vomiting, diarrhea, abdominal pain  : denies urinary pain or discharge  MSK: denies muscle aches, leg swelling  Neuro: DIZZINESS, denies headaches, numbness, tingling  Skin: denies rashes, bruises

## 2022-10-25 NOTE — ED PROVIDER NOTE - OBJECTIVE STATEMENT
This is a 52 yo male who denies PMHx presenting with chest pain and SOB. Symptoms started several days ago. Pain localized to the left chest with radiation to left shoulder and back, has been constant, rated 10/10, sharp. He has not had similar symptoms prior. He also endorses associated SOB worse with exertion, dizziness, blurred vision, palpitations, and abdominal pain. He has not seen a physician in several years due to not having any health issues that he is aware of. He otherwise denies fever, chills, nausea, vomiting, urinary symptoms, LE pain/swelling, recent travel, sick contacts, recent surgeries

## 2022-10-25 NOTE — ED CDU PROVIDER INITIAL DAY NOTE - PHYSICAL EXAMINATION
GENERAL: well appearing in no acute distress, non-toxic appearing  HEAD: normocephalic, atraumatic  HEENT: normal conjunctiva, oral mucosa moist, neck supple  CARDIAC: regular rate and rhythm, normal S1S2, no appreciable murmurs, 2+ pulses in UE/LE b/l  PULM: normal breath sounds, clear to ascultation bilaterally, no rales, rhonchi, wheezing  GI: RUQ TENDERNESS without guarding, abdomen nondistended, soft, no guarding, rebound tenderness  : no CVA tenderness b/l, no suprapubic tenderness  NEURO: no focal motor or sensory deficits, CN2-12 intact, normal speech, PERRLA, EOMI, normal gait, AAOx3  MSK: no peripheral edema, no calf tenderness b/l  SKIN: well-perfused, extremities warm, no visible rashes  PSYCH: appropriate mood and affect  - jorge tai

## 2022-10-25 NOTE — ED CDU PROVIDER INITIAL DAY NOTE - NS ED ATTENDING STATEMENT MOD
This was a shared visit with the DONN. I reviewed and verified the documentation and independently performed the documented:

## 2022-10-25 NOTE — ED CDU PROVIDER DISPOSITION NOTE - NSFOLLOWUPINSTRUCTIONS_ED_ALL_ED_FT
Rest, drink plenty of fluids.  Advance activity as tolerated.  Continue all previously prescribed medications as directed.  Follow up with your primary care physician and cardiologist Dr. Peralta in 48-72 hours- bring copies of your results. Call on referral list given for next available appointment. Return to the ER for worsening or persistent symptoms, and/or ANY NEW OR CONCERNING SYMPTOMS. If you have issues obtaining follow up, please call: [phone] to obtain a doctor or specialist who takes your insurance in your area.

## 2022-10-25 NOTE — ED ADULT NURSE NOTE - OBJECTIVE STATEMENT
Break Coverage RN: Received pt in room 14, ambulatory, pt A&Ox4, respirations even and unlabored b/l. Pt c/o L. sided chest pain, SOB, dizziness. Abdomen soft, nondistended, nontender. Appears in no apparent distress. Sinus annette on cardiac monitor. IVL 20g Angiocath placed on right AC. Labs sent. Will continue to monitor.